# Patient Record
Sex: MALE
[De-identification: names, ages, dates, MRNs, and addresses within clinical notes are randomized per-mention and may not be internally consistent; named-entity substitution may affect disease eponyms.]

---
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
## 2023-01-18 ENCOUNTER — APPOINTMENT (OUTPATIENT)
Dept: NEUROLOGY | Facility: CLINIC | Age: 18
End: 2023-01-18
Payer: COMMERCIAL

## 2023-01-18 PROCEDURE — 95819 EEG AWAKE AND ASLEEP: CPT

## 2023-03-17 ENCOUNTER — APPOINTMENT (OUTPATIENT)
Dept: NEUROLOGY | Facility: CLINIC | Age: 18
End: 2023-03-17
Payer: COMMERCIAL

## 2023-03-17 PROCEDURE — 99214 OFFICE O/P EST MOD 30 MIN: CPT

## 2023-03-17 RX ORDER — DIVALPROEX SODIUM 500 1/1
500 TABLET, EXTENDED RELEASE ORAL DAILY
Qty: 180 | Refills: 1 | Status: ACTIVE | COMMUNITY
Start: 2022-12-22 | End: 1900-01-01

## 2023-03-17 RX ORDER — DIVALPROEX SODIUM 250 MG/1
250 TABLET, DELAYED RELEASE ORAL
Qty: 90 | Refills: 1 | Status: ACTIVE | COMMUNITY
Start: 1900-01-01 | End: 1900-01-01

## 2023-03-17 NOTE — PHYSICAL EXAM
[General Appearance - Alert] : alert [General Appearance - In No Acute Distress] : in no acute distress [General Appearance - Well Nourished] : well nourished [General Appearance - Well Developed] : well developed [General Appearance - Well-Appearing] : healthy appearing [Oriented To Time, Place, And Person] : oriented to person, place, and time [Affect] : the affect was normal [Mood] : the mood was normal [Memory Recent] : recent memory was not impaired [Memory Remote] : remote memory was not impaired [Cranial Nerves Optic (II)] : visual acuity intact bilaterally,  visual fields full to confrontation, pupils equal round and reactive to light [Cranial Nerves Oculomotor (III)] : extraocular motion intact [Cranial Nerves Facial (VII)] : face symmetrical [Cranial Nerves Accessory (XI - Cranial And Spinal)] : head turning and shoulder shrug symmetric [Motor Tone] : muscle tone was normal in all four extremities [Motor Strength] : muscle strength was normal in all four extremities [Involuntary Movements] : no involuntary movements were seen

## 2023-03-17 NOTE — REASON FOR VISIT
[Follow-Up: _____] : a [unfilled] follow-up visit [Consultation] : a consultation visit [FreeTextEntry1] : ADHD/mood disorder/seizure disorder

## 2023-03-17 NOTE — HISTORY OF PRESENT ILLNESS
[FreeTextEntry1] : ORIGINAL PRESENTATION:  Patient is a 17-year-old male who presents today in neurologic consultation accompanied by his mother. He was last seen in 2019 by Dr. Iglesias. He has history of a seizure disorder and ADHD for which he was treated with Depakote 500 mg in the morning and 200 mg in the evening. Since he was recently discharged from the hospital he has only been getting Depakote 250 mg a day. He has had more generalized seizures on this regimen. Patient had been on Concerta for ADHD but he stopped taking it because it caused him to lose his appetite. Patient does go for special education but mom states he needs a stimulant to try to graduate. \par \par TODAY:  I had the pleasure of seeing Mr. Morrell accompanied by his mother today in follow up.  His previous history and physical findings have been reviewed.\par \par He is under our care for ADHD and seizure disorder which are chronic conditions he is receiving continuing active treatment for. In regards to his ADHD he is doing well on a regimen of adderall er 15 mg which he takes when he needs to help with focus and staying on task.  He denies any adverse side effects and we will continue as is without change.  In regards to his seizure disorder he remains seizure free on a regimen of depakote  mg 2 tablets daily and depakore 250 mg at night.  He recently underwent routine EEG which was normal.  He is scheduled to undergo 24 hour next week and we will f/u once results are obtained.

## 2023-03-17 NOTE — REVIEW OF SYSTEMS
[Decr. Concentrating Ability] : decreased concentrating ability [Seizures] : convulsions [Negative] : Respiratory

## 2023-03-20 ENCOUNTER — APPOINTMENT (OUTPATIENT)
Dept: NEUROLOGY | Facility: CLINIC | Age: 18
End: 2023-03-20

## 2023-03-21 ENCOUNTER — APPOINTMENT (OUTPATIENT)
Dept: NEUROLOGY | Facility: CLINIC | Age: 18
End: 2023-03-21

## 2023-03-27 ENCOUNTER — APPOINTMENT (OUTPATIENT)
Dept: NEUROLOGY | Facility: CLINIC | Age: 18
End: 2023-03-27

## 2023-03-28 ENCOUNTER — APPOINTMENT (OUTPATIENT)
Dept: NEUROLOGY | Facility: CLINIC | Age: 18
End: 2023-03-28

## 2023-04-13 ENCOUNTER — APPOINTMENT (OUTPATIENT)
Dept: NEUROLOGY | Facility: CLINIC | Age: 18
End: 2023-04-13
Payer: COMMERCIAL

## 2023-04-13 VITALS
HEART RATE: 74 BPM | WEIGHT: 180 LBS | SYSTOLIC BLOOD PRESSURE: 114 MMHG | BODY MASS INDEX: 23.1 KG/M2 | HEIGHT: 74 IN | DIASTOLIC BLOOD PRESSURE: 72 MMHG

## 2023-04-13 DIAGNOSIS — G40.409 OTHER GENERALIZED EPILEPSY AND EPILEPTIC SYNDROMES, NOT INTRACTABLE, W/OUT STATUS EPILEPTICUS: ICD-10-CM

## 2023-04-13 DIAGNOSIS — F90.9 ATTENTION-DEFICIT HYPERACTIVITY DISORDER, UNSPECIFIED TYPE: ICD-10-CM

## 2023-04-13 PROCEDURE — 99214 OFFICE O/P EST MOD 30 MIN: CPT

## 2023-04-13 RX ORDER — DEXTROAMPHETAMINE SACCHARATE, AMPHETAMINE ASPARTATE MONOHYDRATE, DEXTROAMPHETAMINE SULFATE AND AMPHETAMINE SULFATE 3.75; 3.75; 3.75; 3.75 MG/1; MG/1; MG/1; MG/1
15 CAPSULE, EXTENDED RELEASE ORAL
Qty: 30 | Refills: 0 | Status: ACTIVE | COMMUNITY
Start: 2022-12-22 | End: 1900-01-01

## 2023-04-13 RX ORDER — DIVALPROEX SODIUM 250 MG/1
250 TABLET, DELAYED RELEASE ORAL
Qty: 90 | Refills: 0 | Status: ACTIVE | COMMUNITY
Start: 2023-04-13 | End: 1900-01-01

## 2023-04-13 RX ORDER — DIVALPROEX SODIUM 500 1/1
500 TABLET, EXTENDED RELEASE ORAL
Qty: 90 | Refills: 0 | Status: ACTIVE | COMMUNITY
Start: 2023-04-13 | End: 1900-01-01

## 2023-04-13 NOTE — ASSESSMENT
[FreeTextEntry1] : 17 year old male with ADHD and seizure disorder.  We will continue to prescribe the above medication and will re-order a 24hour EEG. Patient will follow up in 1 month to review his results and discuss his progress.  In regards to his sleep if there is no improvement we may lower the dosage of his medication and will re evaluate at follow up. \par \par I personally reviewed with the PA, this patient's history and physical exam findings, as documented above. I have discussed the relevant areas of concern, having direct implications to the presenting problems and illnesses, and I have personally examined all pertinent and positive and negative findings, which impact on the prior neurological treatment. \par \par \par Liliya Molina, MS, PA-C\par Vasile Espinosa MD\par

## 2023-04-13 NOTE — HISTORY OF PRESENT ILLNESS
[FreeTextEntry1] : ORIGINAL PRESENTATION:  Patient is a 17-year-old male who presents today in neurologic consultation accompanied by his mother. He was last seen in 2019 by Dr. Iglesias. He has history of a seizure disorder and ADHD for which he was treated with Depakote 500 mg in the morning and 200 mg in the evening. Since he was recently discharged from the hospital he has only been getting Depakote 250 mg a day. He has had more generalized seizures on this regimen. Patient had been on Concerta for ADHD but he stopped taking it because it caused him to lose his appetite. Patient does go for special education but mom states he needs a stimulant to try to graduate. \par \par TODAY: I saw Mr. Morrell accompanied by his mother today in follow up.  His previous history and physical findings have been reviewed.\par \par He is under our care for ADHD and seizure disorder which are chronic conditions he is receiving continuing active treatment for. In regards to his ADHD he is on a regimen of Adderall ER15 mg. In regards to his seizure disorder he remains on a regimen of Depakote  mg 2 tablets daily and Depakote 250 mg at night. Patient's  mother reports that his last seizure was 2 weeks ago, lasted 2 minutes before self resolving, and was precipitated by lack of sleep. Mr. Morrell states that he sleeps on average 2 hours a night. We discussed going for a sleep study or reducing the dosage of his Adderall to help his sleep however Mr. Morrell refused.  \par

## 2023-06-19 ENCOUNTER — APPOINTMENT (OUTPATIENT)
Dept: NEUROLOGY | Facility: CLINIC | Age: 18
End: 2023-06-19

## 2023-06-20 ENCOUNTER — APPOINTMENT (OUTPATIENT)
Dept: NEUROLOGY | Facility: CLINIC | Age: 18
End: 2023-06-20

## 2023-07-27 ENCOUNTER — APPOINTMENT (OUTPATIENT)
Dept: NEUROLOGY | Facility: CLINIC | Age: 18
End: 2023-07-27